# Patient Record
Sex: MALE | Race: WHITE | NOT HISPANIC OR LATINO
[De-identification: names, ages, dates, MRNs, and addresses within clinical notes are randomized per-mention and may not be internally consistent; named-entity substitution may affect disease eponyms.]

---

## 2021-06-04 PROBLEM — Z00.00 ENCOUNTER FOR PREVENTIVE HEALTH EXAMINATION: Status: ACTIVE | Noted: 2021-06-04

## 2021-06-08 ENCOUNTER — APPOINTMENT (OUTPATIENT)
Dept: OTOLARYNGOLOGY | Facility: CLINIC | Age: 56
End: 2021-06-08
Payer: COMMERCIAL

## 2021-06-08 VITALS
BODY MASS INDEX: 27.77 KG/M2 | HEART RATE: 71 BPM | DIASTOLIC BLOOD PRESSURE: 97 MMHG | OXYGEN SATURATION: 98 % | HEIGHT: 72 IN | WEIGHT: 205 LBS | SYSTOLIC BLOOD PRESSURE: 155 MMHG | TEMPERATURE: 97.9 F

## 2021-06-08 DIAGNOSIS — Z82.49 FAMILY HISTORY OF ISCHEMIC HEART DISEASE AND OTHER DISEASES OF THE CIRCULATORY SYSTEM: ICD-10-CM

## 2021-06-08 DIAGNOSIS — I10 ESSENTIAL (PRIMARY) HYPERTENSION: ICD-10-CM

## 2021-06-08 DIAGNOSIS — F17.200 NICOTINE DEPENDENCE, UNSPECIFIED, UNCOMPLICATED: ICD-10-CM

## 2021-06-08 DIAGNOSIS — Z78.9 OTHER SPECIFIED HEALTH STATUS: ICD-10-CM

## 2021-06-08 PROCEDURE — 92504 EAR MICROSCOPY EXAMINATION: CPT

## 2021-06-08 PROCEDURE — 99072 ADDL SUPL MATRL&STAF TM PHE: CPT

## 2021-06-08 PROCEDURE — 99202 OFFICE O/P NEW SF 15 MIN: CPT | Mod: 25

## 2021-06-08 RX ORDER — AMLODIPINE BESYLATE 5 MG/1
5 TABLET ORAL
Refills: 0 | Status: ACTIVE | COMMUNITY

## 2021-06-08 NOTE — HISTORY OF PRESENT ILLNESS
[de-identified] : 2 month hx of intermittent clogging in the R ear; no tinnitus but had a night of vertigo 4 months ago. Hx of noise exp in [employer]. Denies: ear pain, drainage, frequent infections, hx of ear surgery or IV antibiotics/chemo; denies a FHx of hearing loss. Qtip use: yes\par

## 2021-06-08 NOTE — PHYSICAL EXAM
[Binocular Microscopic Exam] : Binocular microscopic exam was performed [Normal] : no rashes [de-identified] : deep pars tensa retraction mostly everted w/ valsalva [de-identified] : meniscus

## 2021-06-08 NOTE — ASSESSMENT
[FreeTextEntry1] : Taught the patient how to perform eustachian tube exercises & asked that this be practiced 4-5 times/day. RTC 2 wks for recheck

## 2021-06-08 NOTE — CONSULT LETTER
[Dear  ___] : Dear  [unfilled], [Consult Letter:] : I had the pleasure of evaluating your patient, [unfilled]. [Please see my note below.] : Please see my note below. [Consult Closing:] : Thank you very much for allowing me to participate in the care of this patient.  If you have any questions, please do not hesitate to contact me. [Sincerely,] : Sincerely, [FreeTextEntry3] : FRANCISCO Mancilla Jr, MD, FAAOHNS\par Otolaryngologist\par [facility]

## 2021-08-30 ENCOUNTER — APPOINTMENT (OUTPATIENT)
Dept: OTOLARYNGOLOGY | Facility: CLINIC | Age: 56
End: 2021-08-30
Payer: COMMERCIAL

## 2021-08-30 VITALS
DIASTOLIC BLOOD PRESSURE: 92 MMHG | OXYGEN SATURATION: 96 % | TEMPERATURE: 98 F | HEIGHT: 72 IN | HEART RATE: 80 BPM | BODY MASS INDEX: 27.77 KG/M2 | SYSTOLIC BLOOD PRESSURE: 153 MMHG | WEIGHT: 205 LBS

## 2021-08-30 DIAGNOSIS — H65.01 ACUTE SEROUS OTITIS MEDIA, RIGHT EAR: ICD-10-CM

## 2021-08-30 DIAGNOSIS — H73.891 OTHER SPECIFIED DISORDERS OF TYMPANIC MEMBRANE, RIGHT EAR: ICD-10-CM

## 2021-08-30 DIAGNOSIS — H90.3 SENSORINEURAL HEARING LOSS, BILATERAL: ICD-10-CM

## 2021-08-30 PROCEDURE — 99213 OFFICE O/P EST LOW 20 MIN: CPT | Mod: 25

## 2021-08-30 PROCEDURE — 92504 EAR MICROSCOPY EXAMINATION: CPT

## 2021-08-30 PROCEDURE — 92557 COMPREHENSIVE HEARING TEST: CPT

## 2021-08-30 PROCEDURE — 92550 TYMPANOMETRY & REFLEX THRESH: CPT

## 2021-08-30 NOTE — PHYSICAL EXAM
[Binocular Microscopic Exam] : Binocular microscopic exam was performed [Normal] : right middle ear normal

## 2021-08-30 NOTE — HISTORY OF PRESENT ILLNESS
[de-identified] : f/u intermittent clogging in the R ear which is improved but not completely gone after sev months of valsalvas; no tinnitus and no further vertigo. Hx of noise exp in [employer]. Denies: ear pain, drainage, frequent infections, hx of ear surgery or IV antibiotics/chemo; denies a FHx of hearing loss. \par